# Patient Record
(demographics unavailable — no encounter records)

---

## 2025-01-18 NOTE — HISTORY OF PRESENT ILLNESS
[de-identified] : 6 yr old PMH developmental disorder, ADHD, presents for hair loss of 6 months diffuse and bumpy skin on legs. Mother is concerned for vitamin deficiency

## 2025-01-18 NOTE — HISTORY OF PRESENT ILLNESS
[de-identified] : 6 yr old PMH developmental disorder, ADHD, presents for hair loss of 6 months diffuse and bumpy skin on legs. Mother is concerned for vitamin deficiency

## 2025-01-18 NOTE — PHYSICAL EXAM
[FreeTextEntry3] : negative hair pull test  fine, blonde hair, decreased hair density with intact follicular ostia in sisapho pattern, some patchy hair loss at temoroparietal scalp

## 2025-01-18 NOTE — ASSESSMENT
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

## 2025-01-18 NOTE — HISTORY OF PRESENT ILLNESS
[de-identified] : 6 yr old PMH developmental disorder, ADHD, presents for hair loss of 6 months diffuse and bumpy skin on legs. Mother is concerned for vitamin deficiency

## 2025-02-07 NOTE — HISTORY OF PRESENT ILLNESS
[Gen Ed: _____] : General Education class [unfilled] [Counseling: _____] : Counseling [unfilled] [FreeTextEntry1] : 504 plan on hold [TWNoteComboBox1] : 1st Grade [FreeTextEntry5] : oppositional behavior at home. Magnesium 300 mg in place [de-identified] : Jenna gets overactive, seems anxious and moves her hands and can be redirected. This happens when she is challenged and the task is difficult. Easily redirected. [de-identified] : some concerns [de-identified] : karolyn concerns [de-identified] : some concerns [de-identified] : Mother is concerned about dyslexia, reads right to left, reverses words and letters. [de-identified] : concerns [de-identified] : concerns [de-identified] : Sleep difficulty, responds with morning anger to melatonin. [Major Illness] : no major illness [Major Injury] : no major injury [Surgery] : no surgery [Hospitalizations] : no hospitalizations [New Medications] : no new medication [New Allergies] : no new allergies

## 2025-02-07 NOTE — PHYSICAL EXAM
[Normal] : patient has a normal gait [Attention Intact] : attention intact [Well-behaved during visit] : well-behaved during visit [Smiles responsively] : smiles responsively [Answered questions appropriately] : answered questions appropriately [Responds to name] : responds to name [Able to follow one step commands] : able to follow one step commands [Symbolic play] : symbolic play observed [Joint attention noted] : joint attention noted [Social referencing noted] : social referencing noted [Fidgets] : does not fidget [Echolalia] : no echolalia [de-identified] : Reading hesitant requiring prompting and assistance :from monkey with a tool belt /april burt conversational skills good

## 2025-02-07 NOTE — PLAN
[CSE Evaluation] : - Referred to the Committee on Special Education for complete evaluation including intelligence, educational, and speech and language evaluations [Diagnostic Reading Assessment] : - Comprehensive diagnostic reading assessment [Careful Teacher Selection] : - Next year's teacher(s) should be carefully selected to ensure a favorable fit [Implement IEP] : - Implementation of an Individualized Education Program is recommended. [More Classroom Support] : - In the classroom, [unfilled] will need more support, guidance, positive reinforcement and feedback than many of ~his/her~ classmates.  Accordingly, ~he/she~ will need to be in a classroom with a low student to teacher ratio.  Placement in an inclusion/collaborative teaching classroom would achieve this goal [Resource Room] : - Provision of special education services in a resource room is recommended [Instruction in Executive Function Skills] : - Direct, individualized instruction in executive function-related skills: i.e. task analysis, planning, organization, study strategies, memorization [ADHD EDU/Behav. Strategies (Gen)] : - Those educational and behavioral strategies known to be helpful to children with ADHD should be implemented in the classroom. [Monitor Attention] : - [unfilled]'s attention skills will need to continue to be monitored [Intensive Reading Instruction] : - Intensive reading instruction [Individual In-School Counseling] : - Individual counseling weekly with school psychologist or  [Social Skills] : - Social skills training [Fish Oil] : - Dietary supplementation with fish oil as a source of omega-3 fatty acids - guidelines and cautions discussed [Follow-up visit (re-evaluation): _____] : - Follow-up visit in [unfilled]  for re-evaluation. [CAPS] : - CAPS form completed 1-2 days before the visit. [IEP or IFSP] : - Copy of most recent Individualized Education Program (IEP) or Family Service Plan (IFSP) [Test reports] : - Reports of most recent psychological, educational, speech/language, PT, OT test results [Accuracy] : Accuracy and reliability of clinical impressions [Findings (To Date)] : Findings from evaluation (to date) [Clinical Basis] : Clinical basis for current diagnosis and clinical impressions [Dev. Therapies: ____] : Benefits and limits of developmental therapies: [unfilled] [CAM Therapies] : Benefits and limits of CAM therapies [Counseling] : Benefits and limits of counseling or therapy [Behavior Modification] : Behavior modification strategies [Family Questions] : Family's questions were addressed [Diet] : Evidence-based clinical information about diet [Media / Screen Time] : Importance of limiting electronics, media, and screen time [FreeTextEntry8] : magnesium, saffron

## 2025-02-07 NOTE — REASON FOR VISIT
[Follow-Up Visit] : a follow-up visit for [Patient] : patient [Mother] : mother [FreeTextEntry2] : Kristin did well in  but first grade is challenging. Teacher notes possible anxiety at school especially during unstructured time like recess and school bus. Jenna is in lunch leander 1/week. Jenna has difficulty with social cues and impulsivity. Jenna tends to organize objects in grocery store and shows some compulsive behaviors at home.

## 2025-02-07 NOTE — HISTORY OF PRESENT ILLNESS
[Gen Ed: _____] : General Education class [unfilled] [Counseling: _____] : Counseling [unfilled] [FreeTextEntry1] : 504 plan on hold [FreeTextEntry5] : oppositional behavior at home. Magnesium 300 mg in place [TWNoteComboBox1] : 1st Grade [de-identified] : Jenna gets overactive, seems anxious and moves her hands and can be redirected. This happens when she is challenged and the task is difficult. Easily redirected. [de-identified] : some concerns [de-identified] : karolyn concerns [de-identified] : some concerns [de-identified] : Mother is concerned about dyslexia, reads right to left, reverses words and letters. [de-identified] : concerns [de-identified] : concerns [de-identified] : Sleep difficulty, responds with morning anger to melatonin. [Major Illness] : no major illness [Major Injury] : no major injury [Surgery] : no surgery [Hospitalizations] : no hospitalizations [New Medications] : no new medication [New Allergies] : no new allergies

## 2025-02-07 NOTE — PHYSICAL EXAM
[Normal] : patient has a normal gait [Attention Intact] : attention intact [Well-behaved during visit] : well-behaved during visit [Smiles responsively] : smiles responsively [Answered questions appropriately] : answered questions appropriately [Responds to name] : responds to name [Able to follow one step commands] : able to follow one step commands [Symbolic play] : symbolic play observed [Joint attention noted] : joint attention noted [Social referencing noted] : social referencing noted [Fidgets] : does not fidget [Echolalia] : no echolalia [de-identified] : Reading hesitant requiring prompting and assistance :from monkey with a tool belt /april burt conversational skills good

## 2025-02-07 NOTE — PLAN
[CSE Evaluation] : - Referred to the Committee on Special Education for complete evaluation including intelligence, educational, and speech and language evaluations [Diagnostic Reading Assessment] : - Comprehensive diagnostic reading assessment [Careful Teacher Selection] : - Next year's teacher(s) should be carefully selected to ensure a favorable fit [Implement IEP] : - Implementation of an Individualized Education Program is recommended. [More Classroom Support] : - In the classroom, [unfilled] will need more support, guidance, positive reinforcement and feedback than many of ~his/her~ classmates.  Accordingly, ~he/she~ will need to be in a classroom with a low student to teacher ratio.  Placement in an inclusion/collaborative teaching classroom would achieve this goal [Resource Room] : - Provision of special education services in a resource room is recommended [ADHD EDU/Behav. Strategies (Gen)] : - Those educational and behavioral strategies known to be helpful to children with ADHD should be implemented in the classroom. [Instruction in Executive Function Skills] : - Direct, individualized instruction in executive function-related skills: i.e. task analysis, planning, organization, study strategies, memorization [Monitor Attention] : - [unfilled]'s attention skills will need to continue to be monitored [Intensive Reading Instruction] : - Intensive reading instruction [Individual In-School Counseling] : - Individual counseling weekly with school psychologist or  [Social Skills] : - Social skills training [Fish Oil] : - Dietary supplementation with fish oil as a source of omega-3 fatty acids - guidelines and cautions discussed [Follow-up visit (re-evaluation): _____] : - Follow-up visit in [unfilled]  for re-evaluation. [CAPS] : - CAPS form completed 1-2 days before the visit. [IEP or IFSP] : - Copy of most recent Individualized Education Program (IEP) or Family Service Plan (IFSP) [Test reports] : - Reports of most recent psychological, educational, speech/language, PT, OT test results [Accuracy] : Accuracy and reliability of clinical impressions [Findings (To Date)] : Findings from evaluation (to date) [Clinical Basis] : Clinical basis for current diagnosis and clinical impressions [Dev. Therapies: ____] : Benefits and limits of developmental therapies: [unfilled] [CAM Therapies] : Benefits and limits of CAM therapies [Counseling] : Benefits and limits of counseling or therapy [Behavior Modification] : Behavior modification strategies [Family Questions] : Family's questions were addressed [Diet] : Evidence-based clinical information about diet [Media / Screen Time] : Importance of limiting electronics, media, and screen time [FreeTextEntry8] : magnesium, saffron

## 2025-04-01 NOTE — PHYSICAL EXAM
[Well Nourished] : well nourished [Well Developed] : well developed [Alert] : ~L alert [Active] : active [Normal Breathing Pattern] : normal breathing pattern [No Respiratory Distress] : no respiratory distress [No Allergic Shiners] : no allergic shiners [No Drainage] : no drainage [No Conjunctivitis] : no conjunctivitis [Tympanic Membranes Clear] : tympanic membranes were clear [No Nasal Drainage] : no nasal drainage [No Sinus Tenderness] : no sinus tenderness [No Oral Pallor] : no oral pallor [No Oral Cyanosis] : no oral cyanosis [Non-Erythematous] : non-erythematous [No Exudates] : no exudates [No Postnasal Drip] : no postnasal drip [No Tonsillar Enlargement] : no tonsillar enlargement [Absence Of Retractions] : absence of retractions [Symmetric] : symmetric [Good Expansion] : good expansion [No Acc Muscle Use] : no accessory muscle use [Good aeration to bases] : good aeration to bases [Equal Breath Sounds] : equal breath sounds bilaterally [No Crackles] : no crackles [No Rhonchi] : no rhonchi [No Wheezing] : no wheezing [Normal Sinus Rhythm] : normal sinus rhythm [No Heart Murmur] : no heart murmur [Soft, Non-Tender] : soft, non-tender [No Hepatosplenomegaly] : no hepatosplenomegaly [Non Distended] : was not ~L distended [Abdomen Mass (___ Cm)] : no abdominal mass palpated [Full ROM] : full range of motion [No Clubbing] : no clubbing [Capillary Refill < 2 secs] : capillary refill less than two seconds [No Cyanosis] : no cyanosis [No Petechiae] : no petechiae [No Kyphoscoliosis] : no kyphoscoliosis [No Contractures] : no contractures [Alert and  Oriented] : alert and oriented [No Abnormal Focal Findings] : no abnormal focal findings [Normal Muscle Tone And Reflexes] : normal muscle tone and reflexes [No Birth Marks] : no birth marks [No Rashes] : no rashes [No Skin Lesions] : no skin lesions [FreeTextEntry4] : congested nasal turbinates

## 2025-04-01 NOTE — ASSESSMENT
[FreeTextEntry1] : 7 yo essentially healthy female with initial dx of post-viral airway reactivity/cough and  with protracted bacterial bronchitis when seen at age 3 years of in . NOte of maternal history of asthma.Allergy testing was negative. This said, triggers have been viral infections.  She had been doing well over the years until school attendance this past .  She however has not been on inhaled medications.  The more significant symptom has been nasal congestion. She has been on Zyrtec the past  and currently this spring season. She is averse to nasal sprays having had sensory issues ascribed to recent diagnosis of ADHD.  I discussed a diagnosis of mild, intermittent asthma confounded by rhinitis.  I further discussed the followin.  evolving triggers to include allergens. Mom to procure results of more recent allergy testing. No eczema at this time. 2.  management of upper airway allergies as these may confound asthma.  3.  review of intermittent regimen with inhaled steroids and that frequent use will inform the decision re. need for daily use. 4.  Use of spacer 5,  observe snoring.  NO concerns at this time for TERESSA as a confounder of airway disease. 6. standby prednisolone as family is leaving for [place] for vacation.  Recommendations: 1.  Fluticasone propionate 44 ucg at 2 puffs BID until better then stop. Keep track of use. Rinse mouth after use. 2.  Indications for albuterol were reviewed. 3.  Indication for oral steorids was reviewed, 4. Spacer use demonstrated. 5.  Zyrtec for allergies. 6.  Follow up in 3 months.

## 2025-04-01 NOTE — HISTORY OF PRESENT ILLNESS
[FreeTextEntry1] : Interval history: No inhaled medication use for about a year now, actually ran out of meds.  Since onset of school this past fall, has had nasal congestion and cough. NO difficulty breathing. No UC, ED consults, no systemic steroid use.  She was seen by an allergist a few months ago and tested for cinnamon along with other allergens. She is on Zyrtec during the fall and spring seasons. Of late, has had nasal congestion relieved by saline nebs. She is averse to nasal sprays.  SHe snores occasionally.  Overall with good exercise tolerance and no GI issues. She has had a recent dx of ADHD but not on meds.  No new construction, pets at home.  Seen by our NP 1/4/23: Interval history: Productive cough for > 1 month. Started the budesonide BID (only gave for one week) and Atrovent PRN with mild improvement. Cough is worse at night with increased secretions. No hospitalizations, no ER visits, no oral steroids No SOB or cough with activity when well, +snoring.  Modified Asthma Predictive Index: Major risk factors: 1. +parental hx of asthma- mother 2. No allergic rhinitis 3. No eczema Assessment:  Jenna is a 3 yo F with post-viral airway reactivity/cough and initially with protracted bacterial bronchitis. She has + risk factors for the development of asthma i.e. a parental history of asthma. Her symptoms of reactive airway disease are probably secondary to airway inflammation and reactivity triggered by viral illness. Of note, recent skin testing was negative. She has been doing well using budesonide and Atrovent PRN with viral illnesses, however she has had a productive cough for the past 2 months with nocturnal awakening despite using Atrovent PRN. Mother misunderstood instructions and stopped budesonide only 1 week after using. Prescribed Flovent 44 at 2 puffs BID as the ICS.  Continue Atrovent or Xopenex q4 hours PRN for cough. Start ICS BID until no cough for 1-2 weeks. I switched her from budesonide to Flovent because she is no longer tolerating the nebulizer. Spacer teaching was provided. Start Augmentin x 2 weeks for possible PBB. I will consider oral steroids if no improvement in symptoms with above regimen.    last seen 6/3/22: 3 yo essentially healthy female with post-viral airway reactivity/cough and initially with protracted bacterial bronchitis. NOte of maternal history of asthma. No concerns re. persistent rhinitis with postnasal drip or TERESSA as confounders. Of note is that she has a history of snoring and in fact seen at the ENT clinic in 2020 with recommendation for a sleep study. MOm states that snoring is much better and she is not concerned at the moment. Recent allergy testing was negative. This said, triggers have been viral infections.    She responds to ATrovent even as mom states viral infection still "abound". As "back up plan" that will also inform decisions to go on daily controller therapy (babatunde. in fall and winter when she goes back to school), I discussed use of budesonide twice daily for a week or until better then stop. Mom was receptive to this intermittent regimen and that Jenna at this time prefers nebulized route.    Recommendations:  1. ATrovent as nebulized dose every 4 hours for cough.  2. 0.9% sailne 2-3 times a day for nasal and chest congestion. Follow with chest clapping.  3. When sick with a cold and with persistent cough despite Atrovent in 24 hours, start budesonide 0.25 mg twice a day using nebulizer for about a week or until better then stop. WE will keep track of usage.